# Patient Record
Sex: MALE | Race: BLACK OR AFRICAN AMERICAN | Employment: UNEMPLOYED | URBAN - METROPOLITAN AREA
[De-identification: names, ages, dates, MRNs, and addresses within clinical notes are randomized per-mention and may not be internally consistent; named-entity substitution may affect disease eponyms.]

---

## 2019-06-21 ENCOUNTER — HOSPITAL ENCOUNTER (OUTPATIENT)
Dept: MRI IMAGING | Age: 44
Discharge: HOME | End: 2019-06-21
Attending: INTERNAL MEDICINE
Payer: MEDICARE

## 2019-06-21 DIAGNOSIS — M25.569 PAINFUL KNEE: ICD-10-CM

## 2019-06-21 PROCEDURE — 73721 MRI JNT OF LWR EXTRE W/O DYE: CPT

## 2020-02-12 ENCOUNTER — HOSPITAL ENCOUNTER (EMERGENCY)
Age: 45
Discharge: HOME | End: 2020-02-12
Attending: EMERGENCY MEDICINE
Payer: MEDICARE

## 2020-02-12 VITALS
HEIGHT: 65 IN | DIASTOLIC BLOOD PRESSURE: 121 MMHG | RESPIRATION RATE: 18 BRPM | OXYGEN SATURATION: 98 % | HEART RATE: 88 BPM | BODY MASS INDEX: 35.57 KG/M2 | WEIGHT: 213.5 LBS | SYSTOLIC BLOOD PRESSURE: 188 MMHG | TEMPERATURE: 98.5 F

## 2020-02-12 DIAGNOSIS — H65.01 RIGHT ACUTE SEROUS OTITIS MEDIA, RECURRENCE NOT SPECIFIED: Primary | ICD-10-CM

## 2020-02-12 PROCEDURE — 99283 EMERGENCY DEPT VISIT LOW MDM: CPT

## 2020-02-12 RX ORDER — AMOXICILLIN 875 MG/1
875 TABLET, FILM COATED ORAL 2 TIMES DAILY
Qty: 20 TAB | Refills: 0 | Status: SHIPPED | OUTPATIENT
Start: 2020-02-12 | End: 2020-02-22

## 2020-02-12 RX ORDER — IBUPROFEN 800 MG/1
800 TABLET ORAL
Qty: 20 TAB | Refills: 0 | Status: SHIPPED | OUTPATIENT
Start: 2020-02-12 | End: 2020-02-19

## 2020-02-12 NOTE — ED NOTES
Pt presents ambulatory to ED complaining of ear ache beginning this morning. Pt reports dx of HTN about a year ago. Pt states he took his amlodipine (unsure of dosage) this morning. Pt states he does not take it daily, only when he remembers to take it. Pt is alert and oriented x 4, RR even and unlabored, skin is warm and dry. Assesment completed and pt updated on plan of care. Emergency Department Nursing Plan of Care       The Nursing Plan of Care is developed from the Nursing assessment and Emergency Department Attending provider initial evaluation. The plan of care may be reviewed in the ED Provider note.     The Plan of Care was developed with the following considerations:   Patient / Family readiness to learn indicated by:verbalized understanding  Persons(s) to be included in education: patient  Barriers to Learning/Limitations:Michelle Nicole [address].    2/12/2020   5:42 PM

## 2020-02-12 NOTE — DISCHARGE INSTRUCTIONS
Patient Education        Middle Ear Fluid: Care Instructions  Your Care Instructions    Fluid often builds up inside the ear during a cold or allergies. Usually the fluid drains away, but sometimes a small tube in the ear, called the eustachian tube, stays blocked for months. Symptoms of fluid buildup may include:  · Popping, ringing, or a feeling of fullness or pressure in the ear. · Trouble hearing. · Balance problems and dizziness. In most cases, you can treat yourself at home. Follow-up care is a key part of your treatment and safety. Be sure to make and go to all appointments, and call your doctor if you are having problems. It's also a good idea to know your test results and keep a list of the medicines you take. How can you care for yourself at home? · In most cases, the fluid clears up within a few months without treatment. You may need more tests if the fluid does not clear up after 3 months. · If your doctor prescribed antibiotics, take them as directed. Do not stop taking them just because you feel better. You need to take the full course of antibiotics. When should you call for help? Call your doctor now or seek immediate medical care if:    · You have symptoms of infection, such as:  ? Increased pain, swelling, warmth, or redness. ? Pus draining from the area. ? A fever.    Watch closely for changes in your health, and be sure to contact your doctor if:    · You notice changes in hearing.     · You do not get better as expected. Where can you learn more? Go to http://nora-kim.info/. Enter O361 in the search box to learn more about \"Middle Ear Fluid: Care Instructions. \"  Current as of: October 21, 2018  Content Version: 12.2  © 1028-8428 [employer]. Care instructions adapted under license by [employer] (which disclaims liability or warranty for this information).  If you have questions about a medical condition or this instruction, always ask your healthcare professional. [place] [address] any warranty or liability for your use of this information.

## 2020-02-12 NOTE — ED PROVIDER NOTES
EMERGENCY DEPARTMENT HISTORY AND PHYSICAL EXAM    Date: 2/12/2020  Patient Name: Marie Lei    History of Presenting Illness     Chief Complaint   Patient presents with    Ear Pain     pt c/o rt ear pain since this morning today. History Provided By: Patient    HPI: Marie Lei is a 53 y.o. male with a PMH of hypertension who presents with right ear pain that began this morning and has progressively worsened through the day. Pain is described as throbbing and rated as a 10/10. Pt has not taken any medication for the pain. Pt states nothing makes the pain better. Pain is constant and does not radiate. Patient reports URI and cold symptoms one week ago. Pt denies fever, N/V/D, SOB, or CP. PCP: None        Past History     Past Medical History:  Past Medical History:   Diagnosis Date    Hypertension        Past Surgical History:  History reviewed. No pertinent surgical history. Family History:  History reviewed. No pertinent family history. Social History:  Social History     Tobacco Use    Smoking status: Current Some Day Smoker    Smokeless tobacco: Never Used   Substance Use Topics    Alcohol use: Yes    Drug use: Not Currently       Allergies:  No Known Allergies      Review of Systems   Review of Systems   Constitutional: Negative for activity change, chills and fever. HENT: Positive for congestion and ear pain. Negative for dental problem, ear discharge, facial swelling, hearing loss and tinnitus. Eyes: Negative for photophobia, pain, discharge and redness. Respiratory: Positive for cough. Negative for chest tightness. Cardiovascular: Negative for chest pain. Gastrointestinal: Negative for nausea and vomiting. Musculoskeletal: Positive for arthralgias and myalgias. Allergic/Immunologic: Negative for food allergies and immunocompromised state. Neurological: Negative for dizziness, speech difficulty, weakness, light-headedness and headaches.    All other systems reviewed and are negative. Physical Exam     Vitals:    02/12/20 1730 02/12/20 1732 02/12/20 1757   BP: (!) 171/110 (!) 187/111 (!) 188/121   Pulse: 88     Resp: 18     Temp: 98.5 °F (36.9 °C)     SpO2: 98%     Weight: 96.8 kg (213 lb 8 oz)     Height: 5' 5\" (1.651 m)       Physical Exam  Vitals signs and nursing note reviewed. Exam conducted with a chaperone present. Constitutional:       General: He is not in acute distress. Appearance: He is not ill-appearing, toxic-appearing or diaphoretic. HENT:      Head: Normocephalic and atraumatic. Right Ear: Swelling and tenderness present. No drainage. A middle ear effusion (white serous fluid noted behind TM) is present. There is no impacted cerumen. No foreign body. No mastoid tenderness. Tympanic membrane is bulging (slightly). Tympanic membrane is not perforated, erythematous or retracted. Left Ear: Hearing and tympanic membrane normal.      Ears:        Nose: Nose normal.      Mouth/Throat:      Mouth: Mucous membranes are moist.   Eyes:      Extraocular Movements: Extraocular movements intact. Conjunctiva/sclera: Conjunctivae normal.      Pupils: Pupils are equal, round, and reactive to light. Neck:      Musculoskeletal: Normal range of motion and neck supple. Cardiovascular:      Rate and Rhythm: Normal rate and regular rhythm. Pulses: Normal pulses. Heart sounds: Normal heart sounds. Pulmonary:      Effort: Pulmonary effort is normal.      Breath sounds: Normal breath sounds. Lymphadenopathy:      Cervical: Cervical adenopathy present. Neurological:      Mental Status: He is alert and oriented to person, place, and time. Diagnostic Study Results     Labs -   No results found for this or any previous visit (from the past 12 hour(s)).     Radiologic Studies -   No orders to display     CT Results  (Last 48 hours)    None        CXR Results  (Last 48 hours)    None            Medical Decision Making   I am the first provider for this patient. I reviewed the vital signs, available nursing notes, past medical history, past surgical history, family history and social history. Vital Signs-Reviewed the patient's vital signs. Disposition:  Discharged    DISCHARGE NOTE:   5:52p      Care plan outlined and precautions discussed. Patient has no new complaints, changes, or physical findings. All medications were reviewed with the patient; will d/c home. All of pt's questions and concerns were addressed. Patient was instructed and agrees to follow up with PCP prn, as well as to return to the ED upon further deterioration. Patient is ready to go home. Follow-up Information     Follow up With Specialties Details Why [id number] Internal Medicine Schedule an appointment as soon as possible for a visit in 1 week As needed [place]  [address] [place] [address] Drive  [phone]          Discharge Medication List as of 2/12/2020  5:52 PM      START taking these medications    Details   amoxicillin (AMOXIL) 875 mg tablet Take 1 Tab by mouth two (2) times a day for 10 days. , Normal, Disp-20 Tab, R-0      ibuprofen (MOTRIN) 800 mg tablet Take 1 Tab by mouth every six (6) hours as needed for Pain for up to 7 days. , Normal, Disp-20 Tab, R-0         CONTINUE these medications which have NOT CHANGED    Details   amlodipine besylate (AMLODIPINE PO) Take  by mouth., Historical Med             Provider Notes (Medical Decision Making):   DDX: OM, OE, otalgia, FB, mastoiditis    Pt's BP elevated but states that he did take BP meds today and he is asymptomatic. Pt is non compliant with medications and forgets to take them regularly. Do not feel BP needs to be treated at this time. Procedures:  Procedures    Please note that this dictation was completed with Dragon, computer voice recognition software.   Quite often unanticipated grammatical, syntax, homophones, and other interpretive errors are inadvertently transcribed by the computer software. Please disregard these errors. Additionally, please excuse any errors that have escaped final proofreading. Diagnosis     Clinical Impression:   1.  Right acute serous otitis media, recurrence not specified

## 2020-02-12 NOTE — ED NOTES
Discharge instructions were given to the patient by Alma Rosa Ramirez.     The patient left the Emergency Department ambulatory, alert and oriented and in no acute distress with 2 prescriptions. The patient was encouraged to call or return to the ED for worsening issues or problems and was encouraged to schedule a follow up appointment for continuing care. The patient verbalized understanding of discharge instructions and prescriptions, all questions were answered. The patient has no further concerns at this time.

## 2023-03-03 ENCOUNTER — OFFICE VISIT (OUTPATIENT)
Dept: URGENT CARE | Age: 48
End: 2023-03-03

## 2023-03-03 VITALS
OXYGEN SATURATION: 99 % | SYSTOLIC BLOOD PRESSURE: 156 MMHG | HEART RATE: 104 BPM | DIASTOLIC BLOOD PRESSURE: 83 MMHG | BODY MASS INDEX: 33.28 KG/M2 | TEMPERATURE: 98.6 F | WEIGHT: 200 LBS | RESPIRATION RATE: 18 BRPM

## 2023-03-03 DIAGNOSIS — M10.9 GOUTY ARTHRITIS OF BOTH GREAT TOES: Primary | ICD-10-CM

## 2023-03-03 DIAGNOSIS — L02.222: ICD-10-CM

## 2023-03-03 DIAGNOSIS — L02.422 FURUNCLE OF LEFT AXILLA: ICD-10-CM

## 2023-03-03 RX ORDER — CEPHALEXIN 500 MG/1
500 CAPSULE ORAL 2 TIMES DAILY
Qty: 14 CAPSULE | Refills: 0 | Status: SHIPPED | OUTPATIENT
Start: 2023-03-03 | End: 2023-03-10

## 2023-03-03 RX ORDER — PREDNISONE 10 MG/1
TABLET ORAL
Qty: 21 TABLET | Refills: 0 | Status: SHIPPED | OUTPATIENT
Start: 2023-03-03

## 2023-03-03 RX ORDER — DOXYCYCLINE 100 MG/1
100 TABLET ORAL 2 TIMES DAILY
Qty: 14 TABLET | Refills: 0 | Status: SHIPPED | OUTPATIENT
Start: 2023-03-03 | End: 2023-03-10

## 2023-03-04 NOTE — PROGRESS NOTES
3-4 day history of gout flare  Hx of frequent flare ups  This is identical  Toes bilat (great toe)  Pain with ambulation  Ate a bunch of meat and this was exacerbating factor. Denies fever or injury    Secondarily mentions abscess under left arm and another on his back  No injury  It has opened and draining  No fever, chills, vomiting feels well otherwise. Past Medical History:   Diagnosis Date    Gout     Hypertension         History reviewed. No pertinent surgical history. History reviewed. No pertinent family history. Social History     Socioeconomic History    Marital status: SINGLE     Spouse name: Not on file    Number of children: Not on file    Years of education: Not on file    Highest education level: Not on file   Occupational History    Not on file   Tobacco Use    Smoking status: Some Days    Smokeless tobacco: Never   Vaping Use    Vaping Use: Never used   Substance and Sexual Activity    Alcohol use: Yes     Comment: occasional    Drug use: Not Currently    Sexual activity: Not Currently   Other Topics Concern    Not on file   Social History Narrative    Not on file     Social Determinants of Health     Financial Resource Strain: Not on file   Food Insecurity: Not on file   Transportation Needs: Not on file   Physical Activity: Not on file   Stress: Not on file   Social Connections: Not on file   Intimate Partner Violence: Not on file   Housing Stability: Not on file                ALLERGIES: Patient has no known allergies. Review of Systems   All other systems reviewed and are negative. Vitals:    03/03/23 1939 03/03/23 1942   BP: (!) 162/93 (!) 156/83   Pulse: (!) 104    Resp: 18    Temp: 98.6 °F (37 °C)    SpO2: 99%    Weight: 200 lb (90.7 kg)        Physical Exam  Vitals reviewed. Constitutional:       General: He is not in acute distress. Appearance: Normal appearance. He is not ill-appearing or diaphoretic. HENT:      Head: Normocephalic and atraumatic. Eyes:      Extraocular Movements: Extraocular movements intact. Cardiovascular:      Rate and Rhythm: Normal rate. Pulmonary:      Effort: Pulmonary effort is normal.   Musculoskeletal:      Comments: MTP bilat great toe tender to palpation. Mild erythema. Good cap refill. No skin opening. No streaking. Skin:     Capillary Refill: Capillary refill takes less than 2 seconds. Comments: Dermal abscess left axilla and mid back approx 2cm x 3cm open centrally. No pus expressed. No streaking. No necrosis. Neurological:      Mental Status: He is alert and oriented to person, place, and time. Psychiatric:         Mood and Affect: Mood normal.         Behavior: Behavior normal.         Thought Content: Thought content normal.       MDM     Differential Diagnosis; Clinical Impression; Plan:       CLINICAL IMPRESSION:  (M10.9) Gouty arthritis of both great toes  (primary encounter diagnosis)  (L02.222) Furuncle of upper back excluding scapular region  (L02.422) Furuncle of left axilla    Orders Placed This Encounter      predniSONE (STERAPRED DS) 10 mg dose pack          Sig: Take per dose pack instructions          Dispense:  21 Tablet          Refill:  0      doxycycline (ADOXA) 100 mg tablet          Sig: Take 1 Tablet by mouth two (2) times a day for 7 days. Dispense:  14 Tablet          Refill:  0      cephALEXin (KEFLEX) 500 mg capsule          Sig: Take 1 Capsule by mouth two (2) times a day for 7 days. Dispense:  14 Capsule          Refill:  0      Plan:  Exacerbation of gout  Will treat with prednisone given he has responded well to this in past  Furuncle under left axilla and mid back- will treat with cephalexin and doxycycline. Follow up with PCP within 2 weeks for re evlauation. We have reviewed concerning signs/symptoms, normal vs abnormal progression of medical condition and when to seek immediate medical attention.   Schedule with PCP or Urgent Care immediately for worsening or new symptoms. You should see your PCP for updates on your routine health maintenance.              Procedures

## 2023-05-19 RX ORDER — PREDNISONE 10 MG/1
TABLET ORAL
COMMUNITY
Start: 2023-03-03

## 2023-09-02 ENCOUNTER — APPOINTMENT (OUTPATIENT)
Facility: HOSPITAL | Age: 48
End: 2023-09-02

## 2023-09-02 ENCOUNTER — HOSPITAL ENCOUNTER (EMERGENCY)
Facility: HOSPITAL | Age: 48
Discharge: HOME | End: 2023-09-02
Attending: EMERGENCY MEDICINE

## 2023-09-02 VITALS
RESPIRATION RATE: 20 BRPM | BODY MASS INDEX: 34.49 KG/M2 | DIASTOLIC BLOOD PRESSURE: 99 MMHG | HEART RATE: 76 BPM | SYSTOLIC BLOOD PRESSURE: 175 MMHG | OXYGEN SATURATION: 98 % | WEIGHT: 207 LBS | TEMPERATURE: 98.2 F | HEIGHT: 65 IN

## 2023-09-02 DIAGNOSIS — R10.9 LEFT FLANK PAIN: Primary | ICD-10-CM

## 2023-09-02 DIAGNOSIS — N20.0 NEPHROLITHIASIS: ICD-10-CM

## 2023-09-02 DIAGNOSIS — I10 ESSENTIAL HYPERTENSION: ICD-10-CM

## 2023-09-02 DIAGNOSIS — A59.9 TRICHOMONAS INFECTION: ICD-10-CM

## 2023-09-02 LAB
ALBUMIN SERPL-MCNC: 3.4 G/DL (ref 3.5–5)
ALBUMIN/GLOB SERPL: 0.9 (ref 1.1–2.2)
ALP SERPL-CCNC: 84 U/L (ref 45–117)
ALT SERPL-CCNC: 31 U/L (ref 12–78)
ANION GAP SERPL CALC-SCNC: 6 MMOL/L (ref 5–15)
APPEARANCE UR: ABNORMAL
AST SERPL-CCNC: 28 U/L (ref 15–37)
BACTERIA URNS QL MICRO: NEGATIVE /HPF
BASOPHILS # BLD: 0 K/UL (ref 0–0.1)
BASOPHILS NFR BLD: 1 % (ref 0–1)
BILIRUB SERPL-MCNC: 0.3 MG/DL (ref 0.2–1)
BILIRUB UR QL: NEGATIVE
BUN SERPL-MCNC: 22 MG/DL (ref 6–20)
BUN/CREAT SERPL: 15 (ref 12–20)
CALCIUM SERPL-MCNC: 8.6 MG/DL (ref 8.5–10.1)
CHLORIDE SERPL-SCNC: 103 MMOL/L (ref 97–108)
CO2 SERPL-SCNC: 25 MMOL/L (ref 21–32)
COLOR UR: YELLOW
CREAT SERPL-MCNC: 1.42 MG/DL (ref 0.7–1.3)
DIFFERENTIAL METHOD BLD: NORMAL
EOSINOPHIL # BLD: 0.1 K/UL (ref 0–0.4)
EOSINOPHIL NFR BLD: 1 % (ref 0–7)
EPITH CASTS URNS QL MICRO: ABNORMAL /LPF
ERYTHROCYTE [DISTWIDTH] IN BLOOD BY AUTOMATED COUNT: 13.2 % (ref 11.5–14.5)
GLOBULIN SER CALC-MCNC: 3.9 G/DL (ref 2–4)
GLUCOSE SERPL-MCNC: 111 MG/DL (ref 65–100)
GLUCOSE UR STRIP.AUTO-MCNC: NEGATIVE MG/DL
HCT VFR BLD AUTO: 38.2 % (ref 36.6–50.3)
HGB BLD-MCNC: 13.2 G/DL (ref 12.1–17)
HGB UR QL STRIP: ABNORMAL
IMM GRANULOCYTES # BLD AUTO: 0 K/UL (ref 0–0.04)
IMM GRANULOCYTES NFR BLD AUTO: 0 % (ref 0–0.5)
KETONES UR QL STRIP.AUTO: NEGATIVE MG/DL
LEUKOCYTE ESTERASE UR QL STRIP.AUTO: ABNORMAL
LIPASE SERPL-CCNC: 200 U/L (ref 73–393)
LYMPHOCYTES # BLD: 1.8 K/UL (ref 0.8–3.5)
LYMPHOCYTES NFR BLD: 25 % (ref 12–49)
MCH RBC QN AUTO: 30.9 PG (ref 26–34)
MCHC RBC AUTO-ENTMCNC: 34.6 G/DL (ref 30–36.5)
MCV RBC AUTO: 89.5 FL (ref 80–99)
MONOCYTES # BLD: 0.8 K/UL (ref 0–1)
MONOCYTES NFR BLD: 11 % (ref 5–13)
NEUTS SEG # BLD: 4.4 K/UL (ref 1.8–8)
NEUTS SEG NFR BLD: 62 % (ref 32–75)
NITRITE UR QL STRIP.AUTO: NEGATIVE
NRBC # BLD: 0 K/UL (ref 0–0.01)
NRBC BLD-RTO: 0 PER 100 WBC
OTHER: ABNORMAL
PH UR STRIP: 5.5 (ref 5–8)
PLATELET # BLD AUTO: 217 K/UL (ref 150–400)
PMV BLD AUTO: 9.3 FL (ref 8.9–12.9)
POTASSIUM SERPL-SCNC: 4.1 MMOL/L (ref 3.5–5.1)
PROT SERPL-MCNC: 7.3 G/DL (ref 6.4–8.2)
PROT UR STRIP-MCNC: ABNORMAL MG/DL
RBC # BLD AUTO: 4.27 M/UL (ref 4.1–5.7)
RBC #/AREA URNS HPF: ABNORMAL /HPF (ref 0–5)
SODIUM SERPL-SCNC: 134 MMOL/L (ref 136–145)
SP GR UR REFRACTOMETRY: 1.01
TRICHOMONAS UR QL MICRO: PRESENT
URINE CULTURE IF INDICATED: ABNORMAL
UROBILINOGEN UR QL STRIP.AUTO: 0.2 EU/DL (ref 0.2–1)
WBC # BLD AUTO: 7.1 K/UL (ref 4.1–11.1)
WBC URNS QL MICRO: ABNORMAL /HPF (ref 0–4)

## 2023-09-02 PROCEDURE — 96375 TX/PRO/DX INJ NEW DRUG ADDON: CPT

## 2023-09-02 PROCEDURE — 83690 ASSAY OF LIPASE: CPT

## 2023-09-02 PROCEDURE — 6360000004 HC RX CONTRAST MEDICATION: Performed by: EMERGENCY MEDICINE

## 2023-09-02 PROCEDURE — 85025 COMPLETE CBC W/AUTO DIFF WBC: CPT

## 2023-09-02 PROCEDURE — 6360000002 HC RX W HCPCS: Performed by: EMERGENCY MEDICINE

## 2023-09-02 PROCEDURE — 74177 CT ABD & PELVIS W/CONTRAST: CPT

## 2023-09-02 PROCEDURE — 99285 EMERGENCY DEPT VISIT HI MDM: CPT

## 2023-09-02 PROCEDURE — 80053 COMPREHEN METABOLIC PANEL: CPT

## 2023-09-02 PROCEDURE — 6370000000 HC RX 637 (ALT 250 FOR IP): Performed by: EMERGENCY MEDICINE

## 2023-09-02 PROCEDURE — 81001 URINALYSIS AUTO W/SCOPE: CPT

## 2023-09-02 PROCEDURE — 96374 THER/PROPH/DIAG INJ IV PUSH: CPT

## 2023-09-02 PROCEDURE — 36415 COLL VENOUS BLD VENIPUNCTURE: CPT

## 2023-09-02 RX ORDER — MORPHINE SULFATE 4 MG/ML
4 INJECTION, SOLUTION INTRAMUSCULAR; INTRAVENOUS
Status: COMPLETED | OUTPATIENT
Start: 2023-09-02 | End: 2023-09-02

## 2023-09-02 RX ORDER — METRONIDAZOLE 500 MG/1
500 TABLET ORAL 2 TIMES DAILY
Qty: 14 TABLET | Refills: 0 | Status: SHIPPED | OUTPATIENT
Start: 2023-09-02 | End: 2023-09-09

## 2023-09-02 RX ORDER — AMLODIPINE BESYLATE 5 MG/1
10 TABLET ORAL
Status: COMPLETED | OUTPATIENT
Start: 2023-09-02 | End: 2023-09-02

## 2023-09-02 RX ORDER — KETOROLAC TROMETHAMINE 30 MG/ML
15 INJECTION, SOLUTION INTRAMUSCULAR; INTRAVENOUS
Status: COMPLETED | OUTPATIENT
Start: 2023-09-02 | End: 2023-09-02

## 2023-09-02 RX ADMIN — MORPHINE SULFATE 4 MG: 4 INJECTION, SOLUTION INTRAMUSCULAR; INTRAVENOUS at 19:34

## 2023-09-02 RX ADMIN — IOPAMIDOL 100 ML: 755 INJECTION, SOLUTION INTRAVENOUS at 20:06

## 2023-09-02 RX ADMIN — KETOROLAC TROMETHAMINE 15 MG: 30 INJECTION, SOLUTION INTRAMUSCULAR; INTRAVENOUS at 19:34

## 2023-09-02 RX ADMIN — AMLODIPINE BESYLATE 10 MG: 5 TABLET ORAL at 20:14

## 2023-09-02 ASSESSMENT — PAIN SCALES - GENERAL: PAINLEVEL_OUTOF10: 10

## 2023-09-02 ASSESSMENT — PAIN - FUNCTIONAL ASSESSMENT: PAIN_FUNCTIONAL_ASSESSMENT: 0-10

## 2023-09-02 NOTE — ED PROVIDER NOTES
[facility] EMERGENCY DEPT  EMERGENCY DEPARTMENT ENCOUNTER       Pt Name: Hunter Torres  MRN: [id number]  [address] [facility] 1975  Date of evaluation: 9/2/2023  Provider: Mario Lazaro MD   PCP: No primary care provider on file. Note Started: 7:08 PM EDT 9/2/23     CHIEF COMPLAINT       Chief Complaint   Patient presents with    Flank Pain     L flank pain radiating to LLQ with hematuria since this morning. Pain comes in \"waves\". HISTORY OF PRESENT ILLNESS: 1 or more elements      History From: patient, History limited by: none     Hunter Torres is a 46 y.o. male with no significant past medical history with exception of HTN presents emergency department with a chief complaint of left flank pain. Patient reports symptoms began this morning. Reports initially left flank pain which resolved with naproxen. Patient reports pain returned this evening. Reports severe pain in left flank with hematuria. No nausea or vomiting. No diarrhea. No fevers or chills. No history of similar. Please See MDM for Additional Details of the HPI/PMH  Nursing Notes were all reviewed and agreed with or any disagreements were addressed in the HPI. REVIEW OF SYSTEMS        Positives and Pertinent negatives as per HPI. PAST HISTORY     Past Medical History:  Past Medical History:   Diagnosis Date    Gout     Hypertension        Past Surgical History:  No past surgical history on file. Family History:  No family history on file.     Social History:  Social History     Tobacco Use    Smoking status: Some Days    Smokeless tobacco: Never   Substance Use Topics    Alcohol use: Yes    Drug use: Not Currently       Allergies:  No Known Allergies    CURRENT MEDICATIONS      Discharge Medication List as of 9/2/2023  8:51 PM        CONTINUE these medications which have NOT CHANGED    Details   predniSONE 10 MG (21) TBPK Take per dose pack instructionsHistorical Med             SCREENINGS               No data recorded

## 2023-09-03 NOTE — ED NOTES
Discharge instructions have been discussed with patient. Opportunity for questions and further education has been provided to patient. Patient and/or patient guardian expressed understanding. Patient and/or patient guardian understand to seek Emergency Medical attention for chest pain or new or worsening symptoms.        Greg Carter RN  09/02/23 [id number]

## 2023-09-03 NOTE — DISCHARGE INSTRUCTIONS
Please follow-up with your primary care doctor and the urologist for your kidney stones. Your CAT scan showed a solitary left kidney with evidence of a passed stone. Please return for new or worsening symptoms at any time. Please continue to take your amlodipine for high blood pressure. Please take the flagyl for trichomonas.